# Patient Record
(demographics unavailable — no encounter records)

---

## 2025-02-05 NOTE — HISTORY OF PRESENT ILLNESS
[de-identified] : vomiting [FreeTextEntry6] : SELINA presents with persistent fever and vomiting for 3 days, tmax 104, last Tylenol 9:30am, vomited 1 hour ago, not tolerating sips but voided today, no diarrhea. Examined at [facility], FLU and COVID NEG. She was given Ibuprofen and Zofran for nausea. Chills, fatigue, generalized aches, stuffy nose and cough. Sleeping more than usual, No PMHX.

## 2025-02-05 NOTE — REVIEW OF SYSTEMS
[Fever] : fever [Chills] : chills [Malaise] : malaise [Nasal Discharge] : nasal discharge [Nasal Congestion] : nasal congestion [Cough] : cough [Vomiting] : vomiting

## 2025-02-05 NOTE — PHYSICAL EXAM
[Mucoid Discharge] : mucoid discharge [Soft] : soft [Tired appearing] : tired appearing [Tender] : nontender [Distended] : nondistended [Hepatosplenomegaly] : no hepatosplenomegaly [NL] : warm, clear [FreeTextEntry1] : 103.9, alert, shortness of breath [de-identified] : mouth moist [FreeTextEntry9] : hyperactive bowel sounds

## 2025-02-05 NOTE — PHYSICAL EXAM
[Mucoid Discharge] : mucoid discharge [Soft] : soft [Tired appearing] : tired appearing [Tender] : nontender [Distended] : nondistended [Hepatosplenomegaly] : no hepatosplenomegaly [NL] : warm, clear [FreeTextEntry1] : 103.9, alert, shortness of breath [de-identified] : mouth moist [FreeTextEntry9] : hyperactive bowel sounds

## 2025-02-05 NOTE — DISCUSSION/SUMMARY
[FreeTextEntry1] : Arabella needs to be rechecked in the ER for dehydration, she is not tolerating PO and IVF may be indicated. This is the 3rd day of poor PO and fever. I suggested they go to [facility] [place] so we can follow any labwork done. Mom knows she can call for any concerns.

## 2025-02-05 NOTE — DISCUSSION/SUMMARY
[FreeTextEntry1] : Arabella needs to be rechecked in the ER for dehydration, she is not tolerating PO and IVF may be indicated. This is the 3rd day of poor PO and fever. [place] [facility] [place] labwork done. Mom knows she can call for any concerns.

## 2025-02-05 NOTE — HISTORY OF PRESENT ILLNESS
[de-identified] : vomiting [FreeTextEntry6] : SELINA presents with persistent fever and vomiting for 3 days, tmax 104, last Tylenol 9:30am, vomited 1 hour ago, not tolerating sips but voided today, no diarrhea. Examined at [facility], FLU and COVID NEG. She was given Ibuprofen and Zofran for nausea. Chills, fatigue, generalized aches, stuffy nose and cough. Sleeping more than usual, No PMHX.

## 2025-02-28 NOTE — PHYSICAL EXAM
[Cerumen in canal] : cerumen in canal [Erythema] : erythema [Clear to Auscultation Bilaterally] : clear to auscultation bilaterally [NL] : warm, clear [Regular Rate and Rhythm] : regular rate and rhythm [Soft] : soft [Clear] : right tympanic membrane not clear [Wheezing] : no wheezing [Rales] : no rales [Rhonchi] : no rhonchi [Tender] : nontender [Hepatosplenomegaly] : no hepatosplenomegaly

## 2025-02-28 NOTE — DISCUSSION/SUMMARY
[FreeTextEntry1] : F/U d/ from Hosp w Pneumonia x 2 weeks at [facility]   disc 2/21/25 Mother has no papers from Hosp PE afebrile appears wll, no cough Chest CTAB note RTS, resume normal activity If symptoms worsen or concerned, call/return to office. Questions answered.

## 2025-02-28 NOTE — DISCUSSION/SUMMARY
[FreeTextEntry1] : F/U d/ from Hosp w Pneumonia x 2 weeks at [facility]   disc 2/21/25 Mother has no papers from Hosp PE afebrile appears wll, no cough Chest CTAB note RTS, resume normal activity If symptoms worsen or concerned, call/return to office. Questions answered.

## 2025-02-28 NOTE — HISTORY OF PRESENT ILLNESS
[FreeTextEntry6] : F/U d/ from Hosp w Pneumonia x 2 weeks at [facility]   disc 2/21/5

## 2025-02-28 NOTE — HISTORY OF PRESENT ILLNESS
[FreeTextEntry6] : F/U d/ from Hosp w Pneumonia x 2 weeks at [facility]   disc 2/21/5

## 2025-06-01 NOTE — PHYSICAL EXAM
[Alert] : alert [No Acute Distress] : no acute distress [Playful] : playful [Normocephalic] : normocephalic [Conjunctivae with no discharge] : conjunctivae with no discharge [PERRL] : PERRL [EOMI Bilateral] : EOMI bilateral [Auricles Well Formed] : auricles well formed [Clear Tympanic membranes with present light reflex and bony landmarks] : clear tympanic membranes with present light reflex and bony landmarks [No Discharge] : no discharge [Nares Patent] : nares patent [Palate Intact] : palate intact [Pink Nasal Mucosa] : pink nasal mucosa [Uvula Midline] : uvula midline [Nonerythematous Oropharynx] : nonerythematous oropharynx [No Caries] : no caries [Trachea Midline] : trachea midline [Supple, full passive range of motion] : supple, full passive range of motion [No Palpable Masses] : no palpable masses [Symmetric Chest Rise] : symmetric chest rise [Clear to Auscultation Bilaterally] : clear to auscultation bilaterally [Normoactive Precordium] : normoactive precordium [Regular Rate and Rhythm] : regular rate and rhythm [Normal S1, S2 present] : normal S1, S2 present [No Murmurs] : no murmurs [+2 Femoral Pulses] : +2 femoral pulses [Soft] : soft [NonTender] : non tender [Non Distended] : non distended [Normoactive Bowel Sounds] : normoactive bowel sounds [No Splenomegaly] : no splenomegaly [No Hepatomegaly] : no hepatomegaly [Rodrigue 1] : Rodrigue 1 [No Clitoromegaly] : no clitoromegaly [Normal Vagina Introitus] : normal vagina introitus [Patent] : patent [Normally Placed] : normally placed [Symmetric Buttocks Creases] : symmetric buttocks creases [No Abnormal Lymph Nodes Palpated] : no abnormal lymph nodes palpated [Symmetric Hip Rotation] : symmetric hip rotation [No Gait Asymmetry] : no gait asymmetry [No pain or deformities with palpation of bone, muscles, joints] : no pain or deformities with palpation of bone, muscles, joints [Normal Muscle Tone] : normal muscle tone [NoTuft of Hair] : no tuft of hair [No Spinal Dimple] : no spinal dimple [Straight] : straight [+2 Patella DTR] : +2 patella DTR [Cranial Nerves Grossly Intact] : cranial nerves grossly intact [No Rash or Lesions] : no rash or lesions

## 2025-06-01 NOTE — PHYSICAL EXAM
[Alert] : alert [Playful] : playful [No Acute Distress] : no acute distress [Normocephalic] : normocephalic [Conjunctivae with no discharge] : conjunctivae with no discharge [PERRL] : PERRL [EOMI Bilateral] : EOMI bilateral [Auricles Well Formed] : auricles well formed [Clear Tympanic membranes with present light reflex and bony landmarks] : clear tympanic membranes with present light reflex and bony landmarks [No Discharge] : no discharge [Nares Patent] : nares patent [Pink Nasal Mucosa] : pink nasal mucosa [Palate Intact] : palate intact [Uvula Midline] : uvula midline [Nonerythematous Oropharynx] : nonerythematous oropharynx [No Caries] : no caries [Trachea Midline] : trachea midline [Supple, full passive range of motion] : supple, full passive range of motion [No Palpable Masses] : no palpable masses [Symmetric Chest Rise] : symmetric chest rise [Clear to Auscultation Bilaterally] : clear to auscultation bilaterally [Normoactive Precordium] : normoactive precordium [Regular Rate and Rhythm] : regular rate and rhythm [Normal S1, S2 present] : normal S1, S2 present [+2 Femoral Pulses] : +2 femoral pulses [No Murmurs] : no murmurs [Soft] : soft [NonTender] : non tender [Non Distended] : non distended [Normoactive Bowel Sounds] : normoactive bowel sounds [No Hepatomegaly] : no hepatomegaly [No Splenomegaly] : no splenomegaly [Rodrigue 1] : Rodrigue 1 [No Clitoromegaly] : no clitoromegaly [Normal Vagina Introitus] : normal vagina introitus [Patent] : patent [Normally Placed] : normally placed [Symmetric Buttocks Creases] : symmetric buttocks creases [No Abnormal Lymph Nodes Palpated] : no abnormal lymph nodes palpated [Symmetric Hip Rotation] : symmetric hip rotation [No Gait Asymmetry] : no gait asymmetry [No pain or deformities with palpation of bone, muscles, joints] : no pain or deformities with palpation of bone, muscles, joints [Normal Muscle Tone] : normal muscle tone [No Spinal Dimple] : no spinal dimple [NoTuft of Hair] : no tuft of hair [Straight] : straight [+2 Patella DTR] : +2 patella DTR [Cranial Nerves Grossly Intact] : cranial nerves grossly intact [No Rash or Lesions] : no rash or lesions

## 2025-06-02 NOTE — HISTORY OF PRESENT ILLNESS
[Parents] : parents [whole ___ oz/d] : consumes [unfilled] oz of whole cow's milk per day [Meat] : meat [Grains] : grains [Eggs] : eggs [Dairy] : dairy [Normal] : Normal [Brushing teeth] : Brushing teeth [Yes] : Patient goes to dentist yearly [Toothpaste] : Primary Fluoride Source: Toothpaste [In Pre-K] : In Pre-K [Curiosity about body] : Curiosity about body [Playtime (60 min/d)] : Playtime 60 min a day [< 2 hrs of screen time] : Less than 2 hrs of screen time [Appropiate parent-child communication] : Appropriate parent-child communication [Child given choices] : Child given choices [Child Cooperates] : Child cooperates [Parent has appropriate responses to behavior] : Parent has appropriate responses to behavior [No] : Not at  exposure [Water heater temperature set at <120 degrees F] : Water heater temperature set at <120 degrees F [Car seat in back seat] : Car seat in back seat [Carbon Monoxide Detectors] : Carbon monoxide detectors [Smoke Detectors] : Smoke detectors [Supervised outdoor play] : Supervised outdoor play [Up to date] : Up to date [NO] : No [Exposure to electronic nicotine delivery system] : No exposure to electronic nicotine delivery system [FreeTextEntry7] : MARKIE, Quadracel [de-identified] : vision and hearing // Lead, Oral Health [FreeTextEntry1] : Arabella is a healthy 4-year-old child here for well care

## 2025-06-02 NOTE — DISCUSSION/SUMMARY
[Normal Growth] : growth [Normal Development] : development  [No Elimination Concerns] : elimination [Continue Regimen] : feeding [No Skin Concerns] : skin [Normal Sleep Pattern] : sleep [None] : no medical problems [School Readiness] : school readiness [Healthy Personal Habits] : healthy personal habits [TV/Media] : tv/media [Child and Family Involvement] : child and family involvement [Safety] : safety [Anticipatory Guidance Given] : Anticipatory guidance addressed as per the history of present illness section [No Medications] : ~He/She~ is not on any medications [Parent/Guardian] : Parent/Guardian [] : The components of the vaccine(s) to be administered today are listed in the plan of care. The disease(s) for which the vaccine(s) are intended to prevent and the risks have been discussed with the caretaker.  The risks are also included in the appropriate vaccination information statements which have been provided to the patient's caregiver.  The caregiver has given consent to vaccinate. [FreeTextEntry6] : MMRV, Quadracel [FreeTextEntry1] : MMRV, Quadracel, Lead, Oral Health wnl, vision, hearing wnl, physical exam unremarkable, G&D wnl, f/u 1 year

## 2025-06-02 NOTE — HISTORY OF PRESENT ILLNESS
[Parents] : parents [whole ___ oz/d] : consumes [unfilled] oz of whole cow's milk per day [Meat] : meat [Grains] : grains [Eggs] : eggs [Dairy] : dairy [Normal] : Normal [Brushing teeth] : Brushing teeth [Yes] : Patient goes to dentist yearly [Toothpaste] : Primary Fluoride Source: Toothpaste [In Pre-K] : In Pre-K [Curiosity about body] : Curiosity about body [Playtime (60 min/d)] : Playtime 60 min a day [< 2 hrs of screen time] : Less than 2 hrs of screen time [Appropiate parent-child communication] : Appropriate parent-child communication [Child given choices] : Child given choices [Child Cooperates] : Child cooperates [Parent has appropriate responses to behavior] : Parent has appropriate responses to behavior [No] : Not at  exposure [Water heater temperature set at <120 degrees F] : Water heater temperature set at <120 degrees F [Car seat in back seat] : Car seat in back seat [Carbon Monoxide Detectors] : Carbon monoxide detectors [Smoke Detectors] : Smoke detectors [Supervised outdoor play] : Supervised outdoor play [Up to date] : Up to date [NO] : No [Exposure to electronic nicotine delivery system] : No exposure to electronic nicotine delivery system [FreeTextEntry7] : MARKIE, Quadracel [de-identified] : vision and hearing // Lead, Oral Health [FreeTextEntry1] : Arabella is a healthy 4-year-old child here for well care